# Patient Record
Sex: MALE | Race: WHITE | NOT HISPANIC OR LATINO | Employment: OTHER | URBAN - METROPOLITAN AREA
[De-identification: names, ages, dates, MRNs, and addresses within clinical notes are randomized per-mention and may not be internally consistent; named-entity substitution may affect disease eponyms.]

---

## 2024-07-23 ENCOUNTER — TELEPHONE (OUTPATIENT)
Dept: OTOLARYNGOLOGY | Facility: CLINIC | Age: 84
End: 2024-07-23
Payer: MEDICARE

## 2024-07-23 NOTE — TELEPHONE ENCOUNTER
----- Message from Osiris Arriaga sent at 7/23/2024  9:35 AM CDT -----  Type:  Same Day Appointment Request    Caller is requesting a same day appointment.  Caller declined first available appointment listed below.    Name of Caller: Pt's nephewWillian   When is the first available appointment? 07/26 at 8:40AM   Symptoms: Ear wax   Best Call Back Number: [phone]  Additional Information: Please be advised, caller stated that he is going to be going out of town for this Friday and is trying to see if pt can be seen for today or tomorrow

## 2024-07-26 ENCOUNTER — OFFICE VISIT (OUTPATIENT)
Dept: OTOLARYNGOLOGY | Facility: CLINIC | Age: 84
End: 2024-07-26
Payer: MEDICARE

## 2024-07-26 VITALS — HEART RATE: 84 BPM | DIASTOLIC BLOOD PRESSURE: 76 MMHG | SYSTOLIC BLOOD PRESSURE: 154 MMHG | WEIGHT: 188.06 LBS

## 2024-07-26 DIAGNOSIS — H61.23 BILATERAL IMPACTED CERUMEN: Primary | ICD-10-CM

## 2024-07-26 PROCEDURE — 99999 PR PBB SHADOW E&M-EST. PATIENT-LVL III: CPT | Mod: PBBFAC,,, | Performed by: NURSE PRACTITIONER

## 2024-07-26 RX ORDER — METFORMIN HYDROCHLORIDE 750 MG/1
1 TABLET, EXTENDED RELEASE ORAL DAILY
COMMUNITY

## 2024-07-26 RX ORDER — EZETIMIBE 10 MG/1
10 TABLET ORAL
COMMUNITY
Start: 2024-07-12

## 2024-07-26 RX ORDER — ATORVASTATIN CALCIUM 40 MG/1
1 TABLET, FILM COATED ORAL NIGHTLY
COMMUNITY

## 2024-07-26 RX ORDER — METOPROLOL SUCCINATE 100 MG/1
100 TABLET, EXTENDED RELEASE ORAL
COMMUNITY

## 2024-07-26 RX ORDER — AMLODIPINE BESYLATE 10 MG/1
1 TABLET ORAL NIGHTLY
COMMUNITY

## 2024-07-26 RX ORDER — TAMSULOSIN HYDROCHLORIDE 0.4 MG/1
1 CAPSULE ORAL DAILY
COMMUNITY

## 2024-07-26 RX ORDER — HYDROCHLOROTHIAZIDE 25 MG/1
1 TABLET ORAL DAILY
COMMUNITY
Start: 2023-08-28 | End: 2024-08-28

## 2024-07-26 NOTE — PROGRESS NOTES
Patient ID: Peter Soriano Sr. is a 83 y.o. y.o. male    Chief Complaint:   Chief Complaint   Patient presents with    Cerumen Impaction     Bilateral        Patient is referred to me by Panda Menendez in consultation for evaluation of a possible wax impaction in bilateral ears. He is here today with his nephew who is interpreting for him today. They refused our virtual . he has complaints of hearing loss in the affected ears, but denies pain or drainage.  This has been an issue in the past.  The patient has not been using any sort of ear drop to soften the wax.  He was recently was fitted hearing aids. Does not want audiogram today.      Review of Systems   Constitutional: Negative for fever, chills, fatigue and unexpected weight change.   HENT: Positive for ear blockage. Negative for hearing loss, nosebleeds, congestion, sore throat, facial swelling, rhinorrhea, sneezing, trouble swallowing, dental problem, voice change, postnasal drip, sinus pressure, tinnitus and ear discharge.    Eyes: Negative for redness, itching and visual disturbance.   Respiratory: Negative for cough, choking and wheezing.    Cardiovascular: Negative for chest pain and palpitations.   Gastrointestinal: Negative for abdominal pain.        No reflux.   Musculoskeletal: Negative for gait problem.   Skin: Negative for rash.   Neurological: Negative for dizziness, light-headedness and headaches.     No past medical history on file.  No past surgical history on file.  Social History     Socioeconomic History    Marital status:      No family history on file.    Objective:      Vitals:    07/26/24 0850   BP: (!) 154/76   Pulse: 84       Physical Exam   Constitutional: Well appearing / communicating without difficutly.  NAD.  Eyes: EOM I Bilaterally  Head/Face: Normocephalic. Negative paranasal sinus pressure/tenderness.  Salivary glands WNL.  House Brackmann I Bilaterally.     Right Ear: Auricle normal appearance.  External Auditory Canal with cerumen impaction. EAC with no lesions or masses,TM w/o masses/lesions/perforations. TM mobility noted.   Left Ear: Auricle normal appearance. External Auditory Canal with cerumen impaction. EAC with no lesions or masses,TM w/o masses/lesions/perforations. TM mobility noted.  Nose: No gross nasal septal deviation. Inferior Turbinates 3+ bilaterally. No septal perforation. No masses/lesions. External nasal skin appears normal without masses/lesions.   Oral Cavity: Gingiva/lips within normal limits.  Dentition/gingiva healthy appearing. Mucus membranes moist. Floor of mouth soft, no masses palpated. Oral Tongue mobile. Hard Palate appears normal.    Oropharynx: Base of tongue appears normal. No masses/lesions noted. Tonsillar fossa/pharyngeal wall without lesions. Posterior oropharynx WNL.  Soft palate without masses. Midline uvula.   Neck/Lymphatic: No LAD I-VI bilaterally.  No thyromegaly.  No masses noted on exam.     Mirror laryngoscopy/nasopharyngoscopy: Active gag reflex.  Unable to perform.     Neuro/Psychiatric: AOx3.  Normal mood and affect.   Cardiovascular: Normal carotid pulses bilaterally, no increasing jugular venous distention noted at cervical region bilaterally.    Respiratory: Normal respiratory effort, no stridor, no retractions noted.      Cerumen removal under binocular microscopy   Ear Cerumen Removal    Date/Time: 7/26/2024 8:40 AM    Performed by: Maryse Montes NP  Authorized by: Maryse Montes NP    Consent Done?:  Yes (Verbal)    Local anesthetic:  None  Location details:  Both ears  Procedure type: curette    Procedure type comment:  Suction  Cerumen  Removal Results:  Cerumen completely removed  Patient tolerance:  Patient tolerated the procedure well with no immediate complications      Assessment:         ICD-10-CM ICD-9-CM    1. Bilateral impacted cerumen  H61.23 380.4 Ear Cerumen Removal           Plan:       1.   Cerumen impaction:  Removed under  microscopy today without difficulty.  I would recommend the use of a wax softening drop, either over the counter Debrox or mineral oil, on a weekly basis.  I also instructed the patient to avoid Qtips.  2. Follow up as needed         Maryse Montes NP

## 2024-07-26 NOTE — PROCEDURES
Ear Cerumen Removal    Date/Time: 7/26/2024 8:40 AM    Performed by: Maryse Montes NP  Authorized by: Maryse Montes NP    Consent Done?:  Yes (Verbal)    Local anesthetic:  None  Location details:  Both ears  Procedure type: curette    Procedure type comment:  Suction  Cerumen  Removal Results:  Cerumen completely removed  Patient tolerance:  Patient tolerated the procedure well with no immediate complications